# Patient Record
(demographics unavailable — no encounter records)

---

## 2025-05-02 NOTE — HISTORY OF PRESENT ILLNESS
[de-identified] : Patient is here for evaluation of right shoulder pain Affecting quality of life Wakes up at night due to pain  is a boxer  recurrent instability  NAD Right shoulder: TTP ant GH joint, bicipital groove FF 0-175 ER 60 IR T12 5/5 strength scapular abduction, ER, IR Pos Impingement Pos Moreno Pos Cross Arm Adduction pos instability  XRay right shoulder negative for fracture, dislocation, arthritis mri right shoulder ant inf labral, slap tear  Plan went over findings explained the imaging and tx options due to recurrent instability, will proceed with surgery  right shoulder arthroscopy, labral repair, plication, debridement  Operative and nonoperative options discussed with patient. Surgical risks, benefits, and alternatives explained. Surgical risks include but are not exclusive to bleeding, infection, neurovascular damage, continued pain, stiffness, scarring, rsd, dvt/pe, potential failure of surgery that may require further surgery in the future. I went over incisions and rehabilitation. All questions answered.